# Patient Record
Sex: FEMALE | Race: OTHER | Employment: PART TIME | URBAN - METROPOLITAN AREA
[De-identification: names, ages, dates, MRNs, and addresses within clinical notes are randomized per-mention and may not be internally consistent; named-entity substitution may affect disease eponyms.]

---

## 2017-01-12 ENCOUNTER — HOSPITAL ENCOUNTER (EMERGENCY)
Age: 37
Discharge: HOME | End: 2017-01-12
Attending: FAMILY MEDICINE | Admitting: FAMILY MEDICINE
Payer: SELF-PAY

## 2017-01-12 VITALS
RESPIRATION RATE: 16 BRPM | WEIGHT: 280 LBS | HEART RATE: 90 BPM | TEMPERATURE: 99 F | DIASTOLIC BLOOD PRESSURE: 63 MMHG | OXYGEN SATURATION: 100 % | HEIGHT: 66 IN | SYSTOLIC BLOOD PRESSURE: 129 MMHG | BODY MASS INDEX: 45 KG/M2

## 2017-01-12 DIAGNOSIS — K04.7 DENTAL ABSCESS: Primary | ICD-10-CM

## 2017-01-12 PROCEDURE — 99282 EMERGENCY DEPT VISIT SF MDM: CPT

## 2017-01-12 RX ORDER — TRAMADOL HYDROCHLORIDE 50 MG/1
50 TABLET ORAL
Qty: 15 TAB | Refills: 0 | Status: SHIPPED | OUTPATIENT
Start: 2017-01-12 | End: 2017-02-11

## 2017-01-12 RX ORDER — PENICILLIN V POTASSIUM 500 MG/1
500 TABLET, FILM COATED ORAL 4 TIMES DAILY
Qty: 28 TAB | Refills: 0 | Status: SHIPPED | OUTPATIENT
Start: 2017-01-12 | End: 2017-01-19

## 2017-01-12 RX ORDER — OMEPRAZOLE 20 MG/1
20 CAPSULE, DELAYED RELEASE ORAL DAILY
Qty: 20 CAP | Refills: 0 | Status: SHIPPED | OUTPATIENT
Start: 2017-01-12 | End: 2017-02-11

## 2017-01-12 RX ORDER — LIDOCAINE HYDROCHLORIDE 20 MG/ML
15 SOLUTION OROPHARYNGEAL AS NEEDED
Qty: 1 BOTTLE | Refills: 0 | Status: SHIPPED | OUTPATIENT
Start: 2017-01-12 | End: 2017-02-11

## 2017-01-12 NOTE — ED PROVIDER NOTES
[address]  EMERGENCY DEPARTMENT HISTORY AND PHYSICAL EXAM       Date: 1/12/2017   Patient Name: Arnulfo Barger   YOB: 1980  Medical Record Number: [id number]    History of Presenting Illness     Chief Complaint   Patient presents with    Dental Pain    Palpitations        History Provided By:  patient    Additional History:   11:43 AM   Arnulfo Barger is a 39 y.o. female with a hx of dental pain who presents to the emergency department C/o 8/10 left lower dental pain onset 4 days ago. Associated symptoms include palpitations and subjective fever. Pt reports she has tried Ibuprofen (last few hours ago - 600mg every 3 hours). NKDA. Pt denies chills and any other symptoms or complaints. Primary Care Provider: None   Specialist:    Past History     Past Medical History:   Past Medical History   Diagnosis Date    Abscess         Past Surgical History:   Past Surgical History   Procedure Laterality Date    Hx gyn       tubal ligation    Hx cholecystectomy      Hx heent       tonsils and adenoids        Family History:   History reviewed. No pertinent family history. Social History:   Social History   Substance Use Topics    Smoking status: Former Smoker     Packs/day: 0.50    Smokeless tobacco: None    Alcohol use No        Allergies:   No Known Allergies     Review of Systems   Review of Systems   Constitutional: Positive for fever (subjective). Negative for chills. HENT: Positive for dental problem (left lower). Cardiovascular: Positive for palpitations. All other systems reviewed and are negative. Physical Exam  Vitals:    01/12/17 1134   BP: 129/63   Pulse: 90   Resp: 16   Temp: 99 °F (37.2 °C)   SpO2: 100%   Weight: 127 kg (280 lb)   Height: 5' 6\" (1.676 m)       Physical Exam   Nursing note and vitals reviewed. Vital signs and nursing notes reviewed    CONSTITUTIONAL: Alert; well-developed; overweight. Mild pain distress.    HEAD: Normocephalic, atraumatic  EYES: PERRL; EOM's intact. ENTM: Nose: no rhinorrhea; Throat: no erythema or exudate, mucous membranes moist. Multiple missing teeth, multiple dental caries, and tenderness of the left lower gum line. Neck:  No JVD, supple without lymphadenopathy  RESP: Chest clear, equal breath sounds. CV: S1 and S2 WNL; No murmurs, gallops or rubs. GI: Normal bowel sounds, abdomen soft and non-tender. No masses or organomegaly. : No costo-vertebral angle tenderness. BACK:  Non-tender  UPPER EXT:  Normal inspection  LOWER EXT: No edema, no calf tenderness. Distal pulses intact. NEURO: CN intact, reflexes 2/4 and sym, strength 5/5 and sym, sensation intact. SKIN: No rashes; Normal for age and stage. Multiple tattoos on skin. PSYCH:  Alert and oriented, normal affect. Diagnostic Study Results     Labs -    No results found for this or any previous visit (from the past 12 hour(s)). Medical Decision Making   I am the first provider for this patient. I reviewed the vital signs, available nursing notes, past medical history, past surgical history, family history and social history. Vital Signs-Reviewed the patient's vital signs. Patient Vitals for the past 12 hrs:   Temp Pulse Resp BP SpO2   01/12/17 1134 99 °F (37.2 °C) 90 16 129/63 100 %       Pulse Oximetry Analysis - Normal 100% on room air      Old Medical Records: Nursing notes. Medications Given in the ED:  Medications - No data to display    PROGRESS NOTE:   11:43 AM  Initial assessment performed. Discharge Note:  11:58 AM   Pt has been reexamined. Patient has no new complaints, changes, or physical findings. Care plan outlined and precautions discussed. Results were reviewed with the patient. All medications were reviewed with the patient; will d/c home with Lidocaine, Ultram, Veetid, and Prilosec. All of pt's questions and concerns were addressed.  Patient was instructed and agrees to follow up with dentist, as well as to return to the ED upon further deterioration. Patient is ready to go home. Diagnosis   Clinical Impression:   1. Dental abscess         Discussion:  Patient presents with dental pain. She appears to have an early tooth abscess. Will treat with Penicillin. Will also add some viscous Lidocaine and Tramadol. She will be given a prescription for Prilosec because she has been using so many NSAID's and she may have inflammation of her esophagus. Follow-up Information     Follow up With Details Comments Contact Info    Dentist Schedule an appointment as soon as possible for a visit      [facility] EMERGENCY DEPT  As needed, If symptoms worsen [address] [facility] [address]  [phone]          Discharge Medication List as of 1/12/2017 12:06 PM      START taking these medications    Details   lidocaine (LIDOCAINE VISCOUS) 2 % solution Take 15 mL by mouth as needed for Pain., Normal, Disp-1 Bottle, R-0      traMADol (ULTRAM) 50 mg tablet Take 1 Tab by mouth every six (6) hours as needed for Pain. Max Daily Amount: 200 mg., Print, Disp-15 Tab, R-0      omeprazole (PRILOSEC) 20 mg capsule Take 1 Cap by mouth daily. , Normal, Disp-20 Cap, R-0      penicillin v potassium (VEETID) 500 mg tablet Take 1 Tab by mouth four (4) times daily for 7 days. , Normal, Disp-28 Tab, R-0             _______________________________   Attestations: This note is prepared by Mallory Leyva, acting as scribe for Delisa Dupont MD on 01/12/2017 at 11:39 AM    Delisa Dupont MD: The scribe's documentation has been prepared under my direction and personally reviewed by me and its entirety.      _______________________________

## 2017-01-12 NOTE — ED NOTES
I have reviewed discharge instructions with the patient. The patient verbalized understanding.   Patient armband removed and shredded, scripts x 1 given and 3 sent to pharmacy and verified with pt

## 2017-01-12 NOTE — LETTER
NOTIFICATION RETURN TO WORK / SCHOOL 
 
1/12/2017 12:12 PM 
 
Ms. Jose Daniel Dumas [place] [id number] To Whom It May Concern: 
 
Jose Daniel Dumas is currently under the care of THE [facility] EMERGENCY DEPT. She will return to work/school on: 1/14/2017 If there are questions or concerns please have the patient contact our office. Sincerely, Lupe Chávez RN

## 2017-01-12 NOTE — Clinical Note
Dr. Ann May, [address] 
[phone] [facility]: 
[address] [facility], 101 [facility] 
[phone] Fillings, Cleanings, and Extractions [address] [facility] [address], [address] 
[phone] Fillings, Cleanings, and Extractions [facility] [place] [address] [place] [facility] 159 
[phone] Fillings, Cleaning, and Extractions [facility] Department [address] ws, 3947 [facility] 
[phone] [address] 
[phone] Ages 3-18, if attending [facility] 450 [facility] 
201 [facility], [address] 
[phone] Extractions, Fillings, C jeanette [facility], 11 [facility] 
[phone] Oral Surgery - $70 required Cleanings, Fillings, Extractions - $100 [address],  [address] 
[phone] [employer] [address] [facility] [employer] and [address], [address] Dental Clinic Open September to June

## 2017-01-12 NOTE — LETTER
NOTIFICATION RETURN TO WORK / SCHOOL 
 
1/12/2017 12:09 PM 
 
Ms. Julita Holman [facility] [address] [place] [address] To Whom It May Concern: 
 
Julita Holman is currently under the care of THE [facility] EMERGENCY DEPT. She will return to work/school on: 1/13/2017 If there are questions or concerns please have the patient contact our office. Sincerely, Abdifatah Douglas RN

## 2017-01-12 NOTE — DISCHARGE INSTRUCTIONS
Abscessed Tooth: Care Instructions  Your Care Instructions    An abscessed tooth is a tooth that has a pocket of pus in the tissues around it. Pus forms when the body tries to fight an infection caused by bacteria. If the pus cannot drain, it forms an abscess. An abscessed tooth can cause red, swollen gums and throbbing pain, especially when you chew. You may have a bad taste in your mouth and a fever, and your jaw may swell. Damage to the tooth, untreated tooth decay, or gum disease can cause an abscessed tooth. An abscessed tooth needs to be treated by a dental professional right away. If it is not treated, the infection could spread to other parts of your body. Your dentist will give you antibiotics to stop the infection. He or she may make a hole in the tooth or cut open (leroy) the abscess inside your mouth so that the infection can drain, which should relieve your pain. You may need to have a root canal treatment, which tries to save your tooth by taking out the infected pulp and replacing it with a healing medicine and/or a filling. If these treatments do not work, your tooth may have to be removed. Follow-up care is a key part of your treatment and safety. Be sure to make and go to all appointments, and call your doctor if you are having problems. It's also a good idea to know your test results and keep a list of the medicines you take. How can you care for yourself at home? · Reduce pain and swelling in your face and jaw by putting ice or a cold pack on the outside of your cheek for 10 to 20 minutes at a time. Put a thin cloth between the ice and your skin. · Take pain medicines exactly as directed. ¨ If the doctor gave you a prescription medicine for pain, take it as prescribed. ¨ If you are not taking a prescription pain medicine, ask your doctor if you can take an over-the-counter medicine. · Take your antibiotics as directed. Do not stop taking them just because you feel better.  You need to take the full course of antibiotics. To prevent tooth abscess  · Brush and floss every day, and have regular dental checkups. · Eat a healthy diet, and avoid sugary foods and drinks. · Do not smoke or use spit tobacco. Tobacco use slows your ability to heal. It also increases your risk for gum disease and cancer of the mouth and throat. If you need help quitting, talk to your doctor about stop-smoking programs and medicines. These can increase your chances of quitting for good. When should you call for help? Call 911 anytime you think you may need emergency care. For example, call if:  · You have trouble breathing. Call your doctor now or seek immediate medical care if:  · You are dizzy or lightheaded, or you feel like you may faint. · You have a new or higher fever. · You have swelling, redness, or pain that spreads or gets worse. · You have pus coming from the tooth area. · You have an earache or pain behind your ear. · You have a fever with a stiff neck or a severe headache. · You are sensitive to light or feel very sleepy or confused. Watch closely for changes in your health, and be sure to contact your doctor if:  · You do not get better as expected. Where can you learn more? Go to http://vijay-cassandra.info/. Enter G613 in the search box to learn more about \"Abscessed Tooth: Care Instructions. \"  Current as of: August 9, 2016  Content Version: 11.1  © 6383-0661 [employer]. Care instructions adapted under license by [employer] (which disclaims liability or warranty for this information). If you have questions about a medical condition or this instruction, always ask your healthcare professional. [place] [address] any warranty or liability for your use of this information.

## 2017-01-12 NOTE — ED TRIAGE NOTES
Pt c/o lt lower tooth ahce, onset 4 days ago, pt states lt lower jaw swelling, pt also reports she is taking ibuprofen 600mg every 2-3 hours for pain for 36 hours, taking tylenol 500mg in between pt states she notices that her heart has palpitations when she takes it. Pt states dealing with dental issues for years, pt reports she had a drug addiction that caused teeth to decay, reports clean for 3+ years. Sepsis Screening completed    (  )Patient meets SIRS criteria. ( x )Patient does not meet SIRS criteria.       SIRS Criteria is achieved when two or more of the following are present   Temperature < 96.8°F (36°C) or > 100.9°F (38.3°C)   Heart Rate > 90 beats per minute   Respiratory Rate > 20 beats per minute   WBC count > 12,000 or <4,000 or > 10% bands

## 2017-01-13 ENCOUNTER — HOSPITAL ENCOUNTER (EMERGENCY)
Age: 37
Discharge: HOME | End: 2017-01-13
Attending: EMERGENCY MEDICINE
Payer: SELF-PAY

## 2017-01-13 VITALS
BODY MASS INDEX: 43.39 KG/M2 | SYSTOLIC BLOOD PRESSURE: 152 MMHG | HEIGHT: 66 IN | WEIGHT: 270 LBS | DIASTOLIC BLOOD PRESSURE: 117 MMHG | OXYGEN SATURATION: 100 % | RESPIRATION RATE: 20 BRPM | TEMPERATURE: 98.1 F | HEART RATE: 86 BPM

## 2017-01-13 DIAGNOSIS — Z53.21 PATIENT LEFT WITHOUT BEING SEEN: Primary | ICD-10-CM

## 2017-01-13 PROCEDURE — 75810000275 HC EMERGENCY DEPT VISIT NO LEVEL OF CARE

## 2017-01-13 NOTE — ED TRIAGE NOTES
Patient was seen here yesterday for a dental abscess and states that she has been vomiting and unable to keep anything down including the antibiotics. Patient states that she is also more swollen. Sepsis Screening completed    (  )Patient meets SIRS criteria. (x  )Patient does not meet SIRS criteria.       SIRS Criteria is achieved when two or more of the following are present   Temperature < 96.8°F (36°C) or > 100.9°F (38.3°C)   Heart Rate > 90 beats per minute   Respiratory Rate > 20 beats per minute   WBC count > 12,000 or <4,000 or > 10% bands

## 2017-02-11 ENCOUNTER — HOSPITAL ENCOUNTER (EMERGENCY)
Age: 37
Discharge: HOME | End: 2017-02-11
Attending: FAMILY MEDICINE
Payer: SELF-PAY

## 2017-02-11 VITALS
DIASTOLIC BLOOD PRESSURE: 55 MMHG | WEIGHT: 275 LBS | BODY MASS INDEX: 44.2 KG/M2 | OXYGEN SATURATION: 100 % | HEART RATE: 94 BPM | TEMPERATURE: 98.6 F | SYSTOLIC BLOOD PRESSURE: 102 MMHG | HEIGHT: 66 IN | RESPIRATION RATE: 16 BRPM

## 2017-02-11 DIAGNOSIS — N61.1 LEFT BREAST ABSCESS: ICD-10-CM

## 2017-02-11 DIAGNOSIS — N64.52 NIPPLE DISCHARGE IN FEMALE: Primary | ICD-10-CM

## 2017-02-11 PROCEDURE — 99282 EMERGENCY DEPT VISIT SF MDM: CPT

## 2017-02-11 PROCEDURE — 87070 CULTURE OTHR SPECIMN AEROBIC: CPT | Performed by: PHYSICIAN ASSISTANT

## 2017-02-11 RX ORDER — SULFAMETHOXAZOLE AND TRIMETHOPRIM 800; 160 MG/1; MG/1
2 TABLET ORAL 2 TIMES DAILY
Qty: 40 TAB | Refills: 0 | Status: SHIPPED | OUTPATIENT
Start: 2017-02-11 | End: 2017-02-21

## 2017-02-11 NOTE — ED TRIAGE NOTES
Pt states abscess to lt breast over 1 year intermittently, pt states she does not have insurance so having a hard time getting treatment, pt reports someone suggested surgery at some point, pt states continues to have same issue but having pain in lt breast,

## 2017-02-11 NOTE — LETTER
[facility] [place] 
[facility] 
[address] [id number] [phone] Work/School Note Date: 2/11/2017 To Whom It May concern: 
 
Sandria Moritz was seen and treated today in the emergency room by the following provider(s): 
Attending Provider: Gabriela Mo MD 
Physician Assistant: KAYLEY Madrid. Sandria Moritz was seen in the ED on 2/11/2017. Please excuse her from work on this day. Sincerely, Ileana Burgess PA-C

## 2017-02-11 NOTE — ED PROVIDER NOTES
HPI Comments:   5:56 PM  [place] y.o[place] female presents to the ED C/O chronic abscess to left breast. The abscess have been recurrent since . She has been seen at [facility] in the past for abscess to the same area. She has been told in the past that she needs surgery but she has not had that done due to insurance. Associated sxs of pus leakage from nipple. She has not taken any pain medications or NSAIDs. FMHx of breast cancer. Patient denies fever and any other sxs and complaints. Patient is a [facility] y.[facility]. female presenting with abscess. The history is provided by the patient. No  was used. Abscess    This is a chronic problem. The problem has been gradually worsening. There has been no fever. Affected Location: left breast. The pain has been constant since onset. Associated symptoms include pain. She has tried antibiotic for the symptoms. The treatment provided no relief. Written by DIANA Sibley, as dictated by Rachel Sidhu PA-C   Past Medical History:   Diagnosis Date    Abscess     Anxiety     Bipolar depression [facility])        Past Surgical History:   Procedure Laterality Date    Hx gyn       tubal ligation    Hx cholecystectomy      Hx heent       tonsils and adenoids    Hx  section       x 4         History reviewed. No pertinent family history. Social History     Social History    Marital status:      Spouse name: N/A    Number of children: N/A    Years of education: N/A     Occupational History    Not on file. Social History Main Topics    Smoking status: Former Smoker     Packs/day: 0.50    Smokeless tobacco: Not on file    Alcohol use No    Drug use: Yes     Special: Marijuana    Sexual activity: Not on file     Other Topics Concern    Not on file     Social History Narrative         ALLERGIES: Review of patient's allergies indicates no known allergies. Review of Systems   Constitutional: Negative for fatigue and fever.    HENT: Negative for rhinorrhea and sore throat. Respiratory: Negative for cough and shortness of breath. Cardiovascular: Negative for chest pain and palpitations. Gastrointestinal: Negative for abdominal pain, diarrhea, nausea and vomiting. Genitourinary: Negative for difficulty urinating and dysuria. Musculoskeletal: Negative for arthralgias and myalgias. +left breast pain   Skin: Negative for color change and rash.        +abscess to left breast   Neurological: Negative for light-headedness and headaches. All other systems reviewed and are negative. Vitals:    02/11/17 1749   BP: 102/55   Pulse: 94   Resp: 16   Temp: 98.6 °F (37 °C)   SpO2: 100%   Weight: 124.7 kg (275 lb)   Height: 5' 6\" (1.676 m)            Physical Exam   Constitutional: She is oriented to person, place, and time. She appears well-developed and well-nourished. Well appearing, alert, sitting up on stretcher, non toxic, NAD   HENT:   Head: Normocephalic and atraumatic. Cardiovascular: Normal rate, regular rhythm, normal heart sounds and intact distal pulses. No murmur heard. Pulmonary/Chest: Effort normal and breath sounds normal. No respiratory distress. She has no wheezes. She has no rales. Left breast exhibits mass (tender 1 cm firm nodule just medial to the nipple, white d/c expressed when pressed ) and nipple discharge. No erythema or swelling of the breast   Entire breast mildly TTP    Neurological: She is alert and oriented to person, place, and time. Skin:   Remainder of skin clear    Psychiatric: She has a normal mood and affect. Judgment normal.   Nursing note and vitals reviewed. RESULTS:    No orders to display        Labs Reviewed   CULTURE, WOUND W GRAM STAIN       No results found for this or any previous visit (from the past 12 hour(s)).      MDM  Number of Diagnoses or Management Options  Left breast abscess:   Nipple discharge in female:   Diagnosis management comments: Chronic breast abscess, cyst, no evidence of cellulitis        Amount and/or Complexity of Data Reviewed  Clinical lab tests: reviewed and ordered      MEDICATIONS GIVEN:  Medications - No data to display     Procedures    PROGRESS NOTE:   5:59 PM  Initial Assessment performed  Written by Anjana Rock, ED Scribe, as dictated by Star Huffman PA-C.    DISCUSSION:  Chronic breast abscess, has been seen several times for same problem unable to f/u due to lack of insurance, no drainable area. Pt seen last month for dental problem rx PCN. Prior visits for beast abscess rx Keflex. will culture discharge and rx Bactrim pending culture, consult to . DISCHARGE NOTE:   6:38 PM  Siena Baker  results have been reviewed with her. She has been counseled regarding her diagnosis, treatment, and plan. She verbally conveys understanding and agreement of the signs, symptoms, diagnosis, treatment and prognosis and additionally agrees to follow up as discussed. She also agrees with the care-plan and conveys that all of her questions have been answered. I have also provided discharge instructions for her that include: educational information regarding their diagnosis and treatment, and list of reasons why they would want to return to the ED prior to their follow-up appointment, should her condition change. The patient and/or family has been provided with education for proper Emergency Department utilization. CLINICAL IMPRESSION    1. Nipple discharge in female    2. Left breast abscess         Current Discharge Medication List      START taking these medications    Details   trimethoprim-sulfamethoxazole (BACTRIM DS) 160-800 mg per tablet Take 2 Tabs by mouth two (2) times a day for 10 days.   Qty: 40 Tab, Refills: 0              Follow-up Information     Follow up With Details Comments Contact Info    [facility] CLINIC Call As needed [address] [facility], [address] [facility] [address]    [facility] EMERGENCY DEPT  As needed, If symptoms worsen [address] [place] [address]  [phone]           ATTESTATION:   This note is prepared by Sana Merlos acting as Scribe for Star Huffman PA-C. Star Huffman PA-C: The scribe's documentation has been prepared under my direction and personally reviewed by me in its entirety. I confirm that the note above accurately reflects all work, treatment, procedures, and medical decision making performed by me.

## 2017-02-11 NOTE — ED NOTES
Visualization of left breast abscess deferred, patient already in street clothes at time of assessment and discharge.

## 2017-02-11 NOTE — DISCHARGE INSTRUCTIONS
Nipple Discharge: Care Instructions  Your Care Instructions  Fluid leaking from one or both nipples when you are not breastfeeding is called nipple discharge. Clear, cloudy, or white discharge that appears only when you press on your nipple is usually normal. The more the nipple is pressed or stimulated, the more fluid appears. Yellow, green, or brown discharge is not normal and may be a symptom of an infection or other problem. Spontaneous discharge appears without pressing or stimulating the nipple. This is not normal unless you are pregnant or breastfeeding. It may be a side effect of a medicine, or it may be caused by other health problems. The treatment of spontaneous nipple discharge depends on what is causing it. You may need additional tests to find out what is causing the nipple discharge. Follow-up care is a key part of your treatment and safety. Be sure to make and go to all appointments, and call your doctor if you are having problems. Its also a good idea to know your test results and keep a list of the medicines you take. How can you care for yourself at home? · If your doctor gave you medicine, take it exactly as prescribed. Call your doctor if you think you are having a problem with your medicine. · Wear a supportive bra, such as a sports bra or jog bra. · Avoid stimulating your breast until you have your follow-up appointment. When should you call for help? Call your doctor now or seek immediate medical care if:  · You have symptoms of a breast infection, such as:  ¨ Increased pain, swelling, redness, or warmth around a breast.  ¨ Red streaks extending from the breast.  ¨ Pus draining from a breast.  ¨ A fever. Watch closely for changes in your health, and be sure to contact your doctor if:  · You notice any changes in your breast or discharge. · You do not get better as expected. Where can you learn more? Go to http://vijay-cassandra.info/.   Enter E135 in the search box to learn more about \"Nipple Discharge: Care Instructions. \"  Current as of: May 27, 2016  Content Version: 11.1  © 0028-8976 [employer]. Care instructions adapted under license by [employer] (which disclaims liability or warranty for this information). If you have questions about a medical condition or this instruction, always ask your healthcare professional. [address] any warranty or liability for your use of this information. Skin Abscess: Care Instructions  Your Care Instructions    A skin abscess is a bacterial infection that forms a pocket of pus. A boil is a kind of skin abscess. The doctor may have cut an opening in the abscess so that the pus can drain out. You may have gauze in the cut so that the abscess will stay open and keep draining. You may need antibiotics. You will need to follow up with your doctor to make sure the infection has gone away. The doctor has checked you carefully, but problems can develop later. If you notice any problems or new symptoms, get medical treatment right away. Follow-up care is a key part of your treatment and safety. Be sure to make and go to all appointments, and call your doctor if you are having problems. It's also a good idea to know your test results and keep a list of the medicines you take. How can you care for yourself at home? · Apply warm and dry compresses, a heating pad set on low, or a hot water bottle 3 or 4 times a day for pain. Keep a cloth between the heat source and your skin. · If your doctor prescribed antibiotics, take them as directed. Do not stop taking them just because you feel better. You need to take the full course of antibiotics. · Take pain medicines exactly as directed. ¨ If the doctor gave you a prescription medicine for pain, take it as prescribed.   ¨ If you are not taking a prescription pain medicine, ask your doctor if you can take an over-the-counter medicine. · Keep your bandage clean and dry. Change the bandage whenever it gets wet or dirty, or at least one time a day. · If the abscess was packed with gauze:  ¨ Keep follow-up appointments to have the gauze changed or removed. If the doctor instructed you to remove the gauze, gently pull out all of the gauze when your doctor tells you to. ¨ After the gauze is removed, soak the area in warm water for 15 to 20 minutes 2 times a day, until the wound closes. When should you call for help? Call your doctor now or seek immediate medical care if:  · You have signs of worsening infection, such as:  ¨ Increased pain, swelling, warmth, or redness. ¨ Red streaks leading from the infected skin. ¨ Pus draining from the wound. ¨ A fever. Watch closely for changes in your health, and be sure to contact your doctor if:  · You do not get better as expected. Where can you learn more? Go to http://vijay-cassandra.info/. Enter T651 in the search box to learn more about \"Skin Abscess: Care Instructions. \"  Current as of: February 5, 2016  Content Version: 11.1  © 9837-3712 [employer]. Care instructions adapted under license by [employer] (which disclaims liability or warranty for this information). If you have questions about a medical condition or this instruction, always ask your healthcare professional. [place] [address] any warranty or liability for your use of this information.

## 2017-02-14 LAB
BACTERIA SPEC CULT: ABNORMAL
BACTERIA SPEC CULT: ABNORMAL
GRAM STN SPEC: ABNORMAL
SERVICE CMNT-IMP: ABNORMAL

## 2017-08-17 ENCOUNTER — HOSPITAL ENCOUNTER (EMERGENCY)
Age: 37
Discharge: HOME | End: 2017-08-17
Attending: EMERGENCY MEDICINE
Payer: SELF-PAY

## 2017-08-17 VITALS
DIASTOLIC BLOOD PRESSURE: 57 MMHG | HEIGHT: 65 IN | SYSTOLIC BLOOD PRESSURE: 123 MMHG | WEIGHT: 290 LBS | RESPIRATION RATE: 16 BRPM | BODY MASS INDEX: 48.32 KG/M2 | OXYGEN SATURATION: 100 % | TEMPERATURE: 98.2 F | HEART RATE: 77 BPM

## 2017-08-17 DIAGNOSIS — Z76.0 MEDICATION REFILL: ICD-10-CM

## 2017-08-17 DIAGNOSIS — F31.31 BIPOLAR AFFECTIVE DISORDER, CURRENTLY DEPRESSED, MILD (HCC): Primary | ICD-10-CM

## 2017-08-17 DIAGNOSIS — F43.10 PTSD (POST-TRAUMATIC STRESS DISORDER): ICD-10-CM

## 2017-08-17 LAB
APPEARANCE UR: CLEAR
BACTERIA URNS QL MICRO: ABNORMAL /HPF
BILIRUB UR QL: NEGATIVE
COLOR UR: YELLOW
EPITH CASTS URNS QL MICRO: ABNORMAL /LPF (ref 0–5)
GLUCOSE UR STRIP.AUTO-MCNC: NEGATIVE MG/DL
HCG UR QL: NEGATIVE
HGB UR QL STRIP: ABNORMAL
KETONES UR QL STRIP.AUTO: ABNORMAL MG/DL
LEUKOCYTE ESTERASE UR QL STRIP.AUTO: NEGATIVE
MUCOUS THREADS URNS QL MICRO: ABNORMAL /LPF
NITRITE UR QL STRIP.AUTO: NEGATIVE
PH UR STRIP: 5.5 [PH] (ref 5–8)
PROT UR STRIP-MCNC: NEGATIVE MG/DL
RBC #/AREA URNS HPF: ABNORMAL /HPF (ref 0–5)
SERVICE CMNT-IMP: NORMAL
SP GR UR REFRACTOMETRY: >1.03 (ref 1–1.03)
UROBILINOGEN UR QL STRIP.AUTO: 1 EU/DL (ref 0.2–1)
WBC URNS QL MICRO: ABNORMAL /HPF (ref 0–5)
WET PREP GENITAL: NORMAL

## 2017-08-17 PROCEDURE — 99285 EMERGENCY DEPT VISIT HI MDM: CPT

## 2017-08-17 PROCEDURE — 87210 SMEAR WET MOUNT SALINE/INK: CPT | Performed by: EMERGENCY MEDICINE

## 2017-08-17 PROCEDURE — 81001 URINALYSIS AUTO W/SCOPE: CPT | Performed by: EMERGENCY MEDICINE

## 2017-08-17 PROCEDURE — 81025 URINE PREGNANCY TEST: CPT

## 2017-08-17 PROCEDURE — 87491 CHLMYD TRACH DNA AMP PROBE: CPT | Performed by: EMERGENCY MEDICINE

## 2017-08-17 RX ORDER — TRAZODONE HYDROCHLORIDE 50 MG/1
50 TABLET ORAL
Qty: 15 TAB | Refills: 0 | Status: SHIPPED | OUTPATIENT
Start: 2017-08-17 | End: 2018-02-11

## 2017-08-17 RX ORDER — ESCITALOPRAM OXALATE 20 MG/1
20 TABLET ORAL DAILY
Qty: 15 TAB | Refills: 0 | Status: SHIPPED | OUTPATIENT
Start: 2017-08-17 | End: 2018-02-11

## 2017-08-17 NOTE — ED PROVIDER NOTES
HPI Comments: 3:25 PM     Leeroy Pacheco is a 40 y.o. Female with hx of bipolar disorder, depression, and anxiety presenting to the ED C/O gradually worsening anxiety and depression starting 6 months ago. Associated sxs include \"not breathing right\". Pt reports she was previously on medications for her mental health but has run out of these medications 2 years ago due to a move 3 years ago. Pt states that she stopped taking these medications because she \"thought I was fixed\". Pt is unclear on which medications these are, but reports she was on Trazadone. Pt reports she has an appointment for mental health in 1.5 weeks, . Denies cigarette use, etoh use, and illicit drug use. Denies chance of pregnancy; shx of  tubal ligation and she is currently on her menses. Pt denies suicidal ideations, self-injury, and any other symptoms or complaints. Written by Benja James ED Scribvin, as dictated by Samara Loza MD     Patient is a 40 y.o. female presenting with anxiety. The history is provided by the patient. No  was used. Anxiety    This is a chronic problem. The current episode started more than 1 week ago (6 months ago). The problem has been gradually worsening. Past Medical History:   Diagnosis Date    Abscess     Anxiety     Bipolar depression (HCC)        Past Surgical History:   Procedure Laterality Date    HX  SECTION      x 4    HX CHOLECYSTECTOMY      HX GYN      tubal ligation    HX HEENT      tonsils and adenoids         History reviewed. No pertinent family history. Social History     Social History    Marital status:      Spouse name: N/A    Number of children: N/A    Years of education: N/A     Occupational History    Not on file.      Social History Main Topics    Smoking status: Former Smoker     Packs/day: 0.50    Smokeless tobacco: Never Used    Alcohol use No    Drug use: No    Sexual activity: Not on file     Other Topics Concern    Not on file     Social History Narrative         ALLERGIES: Review of patient's allergies indicates no known allergies. Review of Systems   Genitourinary: Positive for vaginal bleeding. Psychiatric/Behavioral: Negative for self-injury and suicidal ideas. The patient is nervous/anxious. All other systems reviewed and are negative. Vitals:    08/17/17 1521   BP: 123/57   Pulse: 77   Resp: 16   Temp: 98.2 °F (36.8 °C)   SpO2: 100%   Weight: 131.5 kg (290 lb)   Height: 5' 5\" (1.651 m)            Physical Exam   Constitutional: She is oriented to person, place, and time. She appears well-developed and well-nourished. No distress. obese   HENT:   Head: Normocephalic and atraumatic. Eyes: Pupils are equal, round, and reactive to light. Neck: Neck supple. Cardiovascular: Normal rate, regular rhythm, S1 normal, S2 normal and normal heart sounds. Pulmonary/Chest: Breath sounds normal. No respiratory distress. She has no wheezes. She has no rales. She exhibits no tenderness. Abdominal: Soft. She exhibits no distension and no mass. There is no tenderness. There is no guarding. Musculoskeletal: Normal range of motion. She exhibits no edema or tenderness. Neurological: She is alert and oriented to person, place, and time. No cranial nerve deficit. Skin: No rash noted. Psychiatric: Her behavior is normal. Thought content normal. Her mood appears anxious. Nursing note and vitals reviewed.        RESULTS:    PULSE OXIMETRY NOTE:  Pulse-ox is 100% on room air  Interpretation: normal       No orders to display        Labs Reviewed   URINALYSIS W/ RFLX MICROSCOPIC - Abnormal; Notable for the following:        Result Value    Specific gravity >1.030 (*)     Ketone TRACE (*)     Blood LARGE (*)     All other components within normal limits   URINE MICROSCOPIC ONLY - Abnormal; Notable for the following:     Bacteria FEW (*)     Mucus FEW (*)     All other components within normal limits   WET PREP   CHLAMYDIA/NEISSERIA AMPLIFICATION   HCG URINE, QL. - POC       Recent Results (from the past 12 hour(s))   URINALYSIS W/ RFLX MICROSCOPIC    Collection Time: 08/17/17  3:45 PM   Result Value Ref Range    Color YELLOW      Appearance CLEAR      Specific gravity >1.030 (H) 1.005 - 1.030    pH (UA) 5.5 5.0 - 8.0      Protein NEGATIVE  NEG mg/dL    Glucose NEGATIVE  NEG mg/dL    Ketone TRACE (A) NEG mg/dL    Bilirubin NEGATIVE  NEG      Blood LARGE (A) NEG      Urobilinogen 1.0 0.2 - 1.0 EU/dL    Nitrites NEGATIVE  NEG      Leukocyte Esterase NEGATIVE  NEG     WET PREP    Collection Time: 08/17/17  3:45 PM   Result Value Ref Range    Special Requests: NO SPECIAL REQUESTS      Wet prep NO YEAST,TRICHOMONAS OR CLUE CELLS NOTED     URINE MICROSCOPIC ONLY    Collection Time: 08/17/17  3:45 PM   Result Value Ref Range    WBC 0 to 3 0 - 5 /hpf    RBC 4 to 10 0 - 5 /hpf    Epithelial cells FEW 0 - 5 /lpf    Bacteria FEW (A) NEG /hpf    Mucus FEW (A) NEG /lpf   HCG URINE, QL. - POC    Collection Time: 08/17/17  4:13 PM   Result Value Ref Range    Pregnancy test,urine (POC) NEGATIVE  NEG          MDM  Number of Diagnoses or Management Options  Bipolar affective disorder, currently depressed, mild ([facility] [address]):   Medication refill:   PTSD (post-traumatic stress disorder):     ED Course     MEDICATIONS GIVEN:  Medications - No data to display     Pelvic Exam  Date/Time: 8/17/2017 4:17 PM  Performed by: attending  Procedure duration:  5 minutes. Documented by:  Teodora Bui ED Scribe. As dictated by:  Alessio Melgar MD  Exam assisted by:  Anatoliy Krueger RN. Type of exam performed: bimanual and speculum. External genitalia appearance: normal.    Vaginal exam:  discharge. The amount of discharge was:  mild. The discharge was bloody. Cervical exam:  discharge from cervix. Bimanual exam:  normal.    Comments: Unable to palpate the uterus well due to body habitus.            PROGRESS NOTE:  3:25 PM  Initial assessment performed. Written by Elizabet Oliveira ED Scribe, as dictated by Suyapa Matthews MD    PROGRESS NOTE:   3:44 PM  I have discussed the patient with . They have helped the patient before and states she already has an appointment with [facility] for 8/28. Written by DIANA Chibvin, as dictated by Suyapa Matthews MD.     PROGRESS NOTE:   3:57 PM  Discussed the patient with Nava Grant from [facility]. Pt already has an appointment with [facility] on the 28 and will be scheduled for a psychiatry appointment once she is evaluated. Written by DIANA Chibe, as dictated by Suyapa Matthews MD.     DISCHARGE NOTE:  4:57 PM   Trena Booker  results have been reviewed with her. She has been counseled regarding her diagnosis, treatment, and plan. She verbally conveys understanding and agreement of the signs, symptoms, diagnosis, treatment and prognosis and additionally agrees to follow up as discussed. She also agrees with the care-plan and conveys that all of her questions have been answered. I have also provided discharge instructions for her that include: educational information regarding their diagnosis and treatment, and list of reasons why they would want to return to the ED prior to their follow-up appointment, should her condition change. The patient and/or family has been provided with education for proper Emergency Department utilization. CLINICAL IMPRESSION:    1. Bipolar affective disorder, currently depressed, mild ([address]    2. PTSD (post-traumatic stress disorder)    3.  Medication refill        PLAN: DISCHARGE HOME    Follow-up Information     Follow up With Details Comments Contact Info    [facility] Go in 11 days For your appointment as scheduled [address] EMERGENCY DEPT  As needed, If symptoms worsen [address] [facility] [address]  [phone]          Current Discharge Medication List      START taking these medications    Details   escitalopram oxalate (LEXAPRO) 20 mg tablet Take 1 Tab by mouth daily. Qty: 15 Tab, Refills: 0      traZODone (DESYREL) 50 mg tablet Take 1 Tab by mouth nightly. Qty: 15 Tab, Refills: 0             ATTESTATIONS:  This note is prepared by Saumya Laws, acting as Scribe for [employer], MD.    [employer], MD: The scribe's documentation has been prepared under my direction and personally reviewed by me in its entirety. I confirm that the note above accurately reflects all work, treatment, procedures, and medical decision making performed by me.

## 2017-08-17 NOTE — ED NOTES
Amb into ED today w/ reports she took herself off her medications for depression/anxiety 2 years ago thinking she could handle it. Reports she was fired from her job today as a  - has little support locally and feels stressed out. Pt also reports heavier than normal period w/ \"gush\" of blood today while at work - prompting her employer to tell her to go home and not come back. Cristal Wesley

## 2017-08-17 NOTE — ED TRIAGE NOTES
Pt states she is on her period but having heavy bleeding, c/o regular period pain per pt, pt also states she has anxiety and depression, pt states has appt to get help with [employer]. Pt states she has no money or MD to get meds. Sepsis Screening completed    (  )Patient meets SIRS criteria. (  x)Patient does not meet SIRS criteria.       SIRS Criteria is achieved when two or more of the following are present   Temperature < 96.8°F (36°C) or > 100.9°F (38.3°C)   Heart Rate > 90 beats per minute   Respiratory Rate > 20 breaths per minute   WBC count > 12,000 or <4,000 or > 10% bands

## 2017-08-17 NOTE — LETTER
[facility] [place] 
[facility] 
[address] [id number] [phone] Work/School Note Date: 8/17/2017 To Whom It May concern: 
 
Elsa Jj was seen and treated today in the emergency room by the following provider(s): 
Attending Provider: Domitila Kulkarni MD. Please excuse missed work. Elsa Jj may return to work on 8/19/2017. Sincerely, [employer], MD

## 2017-08-17 NOTE — DISCHARGE INSTRUCTIONS
Bipolar Disorder: Care Instructions  Your Care Instructions  Bipolar disorder is an illness that causes extreme mood changes, from times of very high energy (manic episodes) to times of depression. But many people with bipolar disorder show only the symptoms of depression. These moods may cause problems with your work, school, family life, friendships, and how well you function. This disease is also called manic-depression. There is no cure for bipolar disorder, but it can be helped with medicines. Counseling may also help. It is important to take your medicines exactly as prescribed, even when you feel well. You may need lifelong treatment. Follow-up care is a key part of your treatment and safety. Be sure to make and go to all appointments, and call your doctor if you are having problems. It's also a good idea to know your test results and keep a list of the medicines you take. How can you care for yourself at home? · Be safe with medicines. Take your medicines exactly as prescribed. Do not stop or change a medicine without talking to your doctor first. aJson Hopson and your doctor may need to try different combinations of medicines to find what works for you. · Take your medicines on schedule to keep your moods even. When you feel good, you may think that you do not need your medicines. But it is important to keep taking them. · Go to your counseling sessions. Call and talk with your counselor if you can't go to a session or if you don't think the sessions are helping. Do not just stop going. · Get at least 30 minutes of activity on most days of the week. Walking is a good choice. You also may want to do other things, such as running, swimming, or cycling. · Get enough sleep. Keep your room dark and quiet. Try to go to bed at the same time every night. · Eat a healthy diet. This includes whole grains, dairy, fruits, vegetables, and protein. Eat foods from each of these groups. · Try to lower your stress. Manage your time, build a strong support system, and lead a healthy lifestyle. To lower your stress, try physical activity, slow deep breathing, or getting a massage. · Do not use alcohol or illegal drugs. · Learn the early signs of your mood changes. You can then take steps to help yourself feel better. · Ask for help from friends and family when you need it. You may need help with daily chores when you are depressed. When you are manic, you may need support to control your high energy levels. What should you do if someone in your family has bipolar disorder? · Learn about the disease and the signs that it is getting worse. · Remind your family member that you love him or her. · Make a plan with all family members about how to take care of your loved one when his or her symptoms are bad. · Talk about your fears and concerns and those of other family members. Seek counseling if needed. · Do not focus attention only on the person who is in treatment. · Remind yourself that it will take time for changes to occur. · Do not blame yourself for the disease. · Know your legal rights and the legal rights of your family member. Support groups or counselors can help you with this information. · Take care of yourself. Keep up with your own interests, such as your career, hobbies, and friends. Use exercise, positive self-talk, deep breathing, and other relaxing exercises to help lower your stress. · Give yourself time to grieve. You may need to deal with emotions such as anger, fear, and frustration. After you work through your feelings, you will be better able to care for yourself and your family. · If you are having a hard time with your feelings or with your relationship with your family member, talk with a counselor. When should you call for help? Call 911 anytime you think you may need emergency care. For example, call if:  · You feel like hurting yourself or someone else.   · Someone who has bipolar disorder displays dangerous behavior, and you think the person might hurt himself or herself or someone else. Call your doctor now or seek immediate medical care if:  · You hear voices. · Someone you know has bipolar disorder and talks about suicide. Keep the numbers for these national suicide hotlines: 9-830-432-TALK (1-191.113.5251) and 1-163-TROOTVF (5-786.878.8213). If a suicide threat seems real, with a specific plan and a way to carry it out, stay with the person, or ask someone you trust to stay with the person, until you can get help. · Someone you know has bipolar disorder and:  ¨ Starts to give away possessions. ¨ Is using illegal drugs or drinking alcohol heavily. ¨ Talks or writes about death, including writing suicide notes or talking about guns, knives, or pills. ¨ Talks or writes about hurting someone else. ¨ Starts to spend a lot of time alone. ¨ Acts very aggressively or suddenly appears calm. ¨ Talks about beliefs that are not based in reality (delusions). Watch closely for changes in your health, and be sure to contact your doctor if:  · You cannot go to your counseling sessions. Where can you learn more? Go to http://vijay-cassandra.info/. Enter K052 in the search box to learn more about \"Bipolar Disorder: Care Instructions. \"  Current as of: July 26, 2016  Content Version: 11.3  © 4808-9592 [employer]. Care instructions adapted under license by [employer] (which disclaims liability or warranty for this information). If you have questions about a medical condition or this instruction, always ask your healthcare professional. [place] [address] any warranty or liability for your use of this information. Post-Traumatic Stress Disorder (PTSD): Care Instructions  Your Care Instructions  Post-traumatic stress disorder (PTSD) is a mental condition that can result from being in or seeing a traumatic or terrifying event.  These events can include combat, a terrorist attack, a natural disaster, a serious accident, an assault, or a rape. If you have PTSD, you may often relive the experience in nightmares or flashbacks. These are clear and frightening memories of the event. You may also have trouble sleeping. PTSD affects people in very different ways. It can interfere with daily activities such as work or school, and it can make you withdraw from friends or loved ones. Follow-up care is a key part of your treatment and safety. Be sure to make and go to all appointments, and call your doctor if you are having problems. It's also a good idea to know your test results and keep a list of the medicines you take. How can you care for yourself at home? · Take medicines exactly as directed. Call your doctor if you think you are having a problem with your medicine. · Go to your counseling sessions and follow-up appointments. · Recognize and accept your anxiety. Then, when you are in a situation that makes you anxious, say to yourself, \"This is not an emergency. I feel uncomfortable, but I am not in danger. I can keep going even if I feel anxious. \"  · Be kind to your body:  ¨ Relieve tension with exercise or a massage. ¨ Get enough rest.  ¨ Avoid alcohol, caffeine, nicotine, and illegal drugs. They can increase your anxiety level and cause sleep problems. ¨ Learn and do relaxation techniques. See below for more about these techniques. · Engage your mind. Get out and do something you enjoy. Go to a funny movie, or take a walk or hike. Plan your day. Having too much or too little to do can make you anxious. · Keep a record of your symptoms. Discuss your fears with a good friend or family member, or join a support group for people with similar problems. Talking to others sometimes relieves stress. · Get involved in social groups, or volunteer to help others. Being alone sometimes makes things seem worse than they are.   · Get at least 27 minutes of exercise on most days of the week. Walking is a good choice. You also may want to do other activities, such as running, swimming, cycling, or playing tennis or team sports. · Keep the numbers for these national suicide hotlines: 4-772-170-TALK (2-589.211.5907) and 0-299-ZVGKGBW (5-757.330.6162). If you or someone you know talks about suicide or feeling hopeless, get help right away. Relaxation techniques  Do relaxation exercises 10 to 20 minutes a day. You can play soothing, relaxing music while you do them, if you wish. · Tell others in your house that you are going to do your relaxation exercises. Ask them not to disturb you. · Find a comfortable place, away from all distractions and noise. · Lie down on your back, or sit with your back straight. · Focus on your breathing. Make it slow and steady. · Breathe in through your nose. Breathe out through either your nose or mouth. · Breathe deeply, filling up the area between your navel and your rib cage. Breathe so that your belly goes up and down. · Do not hold your breath. · Breathe like this for 5 to 10 minutes. Notice the feeling of calmness throughout your whole body. As you continue to breathe slowly and deeply, relax by doing the following for another 5 to 10 minutes:  · Tighten and relax each muscle group in your body. You can begin at your toes and work your way up to your head. · Imagine your muscle groups relaxing and becoming heavy. · Empty your mind of all thoughts. · Let yourself relax more and more deeply. · Become aware of the state of calmness that surrounds you. · When your relaxation time is over, you can bring yourself back to alertness by moving your fingers and toes and then your hands and feet and then stretching and moving your entire body. Sometimes people fall asleep during relaxation, but they usually wake up shortly afterward.   · Always give yourself time to return to full alertness before you drive a car or do anything that might cause an accident if you are not fully alert. Never play a relaxation tape while you drive a car. When should you call for help? Call 911 anytime you think you may need emergency care. For example, call if:  · You feel you cannot stop from hurting yourself or someone else. Watch closely for changes in your health, and be sure to contact your doctor if:  · Your PTSD symptoms are getting worse. · You have new or worsening symptoms of anxiety. · You are not getting better as expected. Where can you learn more? Go to http://vijay-cassandra.info/. Warner Davis in the search box to learn more about \"Post-Traumatic Stress Disorder (PTSD): Care Instructions. \"  Current as of: July 26, 2016  Content Version: 11.3  © 4941-2599 [employer]. Care instructions adapted under license by [employer] (which disclaims liability or warranty for this information). If you have questions about a medical condition or this instruction, always ask your healthcare professional. [address] any warranty or liability for your use of this information.

## 2017-08-18 LAB
C TRACH RRNA SPEC QL NAA+PROBE: NEGATIVE
N GONORRHOEA RRNA SPEC QL NAA+PROBE: NEGATIVE
SPECIMEN SOURCE: NORMAL

## 2018-02-11 ENCOUNTER — APPOINTMENT (OUTPATIENT)
Dept: GENERAL RADIOLOGY | Age: 38
End: 2018-02-11
Attending: EMERGENCY MEDICINE
Payer: SELF-PAY

## 2018-02-11 ENCOUNTER — HOSPITAL ENCOUNTER (EMERGENCY)
Age: 38
Discharge: HOME | End: 2018-02-11
Attending: EMERGENCY MEDICINE
Payer: SELF-PAY

## 2018-02-11 VITALS
HEART RATE: 74 BPM | SYSTOLIC BLOOD PRESSURE: 153 MMHG | HEIGHT: 65 IN | DIASTOLIC BLOOD PRESSURE: 84 MMHG | OXYGEN SATURATION: 100 % | RESPIRATION RATE: 18 BRPM | BODY MASS INDEX: 48.32 KG/M2 | WEIGHT: 290 LBS | TEMPERATURE: 98.8 F

## 2018-02-11 DIAGNOSIS — J06.9 VIRAL UPPER RESPIRATORY ILLNESS: Primary | ICD-10-CM

## 2018-02-11 DIAGNOSIS — R05.9 COUGH: ICD-10-CM

## 2018-02-11 PROCEDURE — 71046 X-RAY EXAM CHEST 2 VIEWS: CPT

## 2018-02-11 PROCEDURE — 99282 EMERGENCY DEPT VISIT SF MDM: CPT

## 2018-02-11 PROCEDURE — 74011000250 HC RX REV CODE- 250: Performed by: EMERGENCY MEDICINE

## 2018-02-11 PROCEDURE — 94640 AIRWAY INHALATION TREATMENT: CPT

## 2018-02-11 RX ORDER — ALBUTEROL SULFATE 90 UG/1
2 AEROSOL, METERED RESPIRATORY (INHALATION)
Qty: 1 INHALER | Refills: 0 | Status: SHIPPED | OUTPATIENT
Start: 2018-02-11

## 2018-02-11 RX ORDER — IPRATROPIUM BROMIDE AND ALBUTEROL SULFATE 2.5; .5 MG/3ML; MG/3ML
3 SOLUTION RESPIRATORY (INHALATION)
Status: COMPLETED | OUTPATIENT
Start: 2018-02-11 | End: 2018-02-11

## 2018-02-11 RX ORDER — CODEINE PHOSPHATE AND GUAIFENESIN 10; 100 MG/5ML; MG/5ML
10 SOLUTION ORAL
Qty: 118 ML | Refills: 0 | Status: SHIPPED | OUTPATIENT
Start: 2018-02-11

## 2018-02-11 RX ADMIN — IPRATROPIUM BROMIDE AND ALBUTEROL SULFATE 3 ML: .5; 3 SOLUTION RESPIRATORY (INHALATION) at 10:05

## 2018-02-11 NOTE — ED NOTES
Discharge instructions reviewed with the patient with opportunity for questions given. The patient verbalized understanding. Patient armband removed and shredded. VS stable. Patient in stable condition at time of discharge.

## 2018-02-11 NOTE — ED TRIAGE NOTES
Patient with complaints of cough for 2 weeks. Patient denies fever and states that she sometimes see pink in her sputum production.

## 2018-02-11 NOTE — ED PROVIDER NOTES
EMERGENCY DEPARTMENT HISTORY AND PHYSICAL EXAM    Date: 2018  Patient Name: Armando Jamison    History of Presenting Illness     Chief Complaint   Patient presents with    Cough         History Provided By: Patient    Chief Complaint: cough  Duration: 8 Days  Location: ENT  Severity: 6 out of 10  Modifying Factors: deep breathing exacerbates coughing  Associated Symptoms: arthralgias/myalgias, reflux sxs (hx of PUD), dark stools (pt taking Pepto Bismol), and SOB. Additional History (Context):   9:49 AM  Armando Jamison is a 40 y.o. female who presents to the emergency department C/O 6/10 cough with streaky pink sputum onset 8 days ago. Pt felt she was improving 4 days ago from flu-like sxs, but then began having worsening chest congestion followed by cough. Associated sxs include arthralgias/myalgias, reflux sxs (hx of PUD), dark stools (pt taking Pepto Bismol), and SOB. Pt has tried Pepto Bismol to help with reflux. PMHx of abscess, anxiety, and bipolar depression. PSHx includes tubal ligation, tonsillectomy, and . Pt denies hx of asthma. Pt is a former smoker and a non EtOH user. NKDA. Pt denies ear pain and any other sxs or complaints. PCP: None        Past History     Past Medical History:  Past Medical History:   Diagnosis Date    Abscess     Anxiety     Bipolar depression ([facility] [address]        Past Surgical History:  Past Surgical History:   Procedure Laterality Date    HX  SECTION      x 4    HX CHOLECYSTECTOMY      HX GYN      tubal ligation    HX HEENT      tonsils and adenoids       Family History:  History reviewed. No pertinent family history. Social History:  Social History   Substance Use Topics    Smoking status: Former Smoker     Packs/day: 0.50    Smokeless tobacco: Never Used    Alcohol use No       Allergies:  No Known Allergies      Review of Systems   Review of Systems   HENT: Positive for congestion. Negative for ear pain.     Respiratory: Positive for cough and shortness of breath. Gastrointestinal:        (+) Reflux  (+) Dark stools   Musculoskeletal: Positive for arthralgias and myalgias. All other systems reviewed and are negative. Physical Exam     Vitals:    02/11/18 0945 02/11/18 1007 02/11/18 1053   BP: (!) 144/93  153/84   Pulse: 93  74   Resp: 20  18   Temp: 98.8 °F (37.1 °C)     SpO2: 100% 100%    Weight: 131.5 kg (290 lb)     Height: 5' 5\" (1.651 m)       Physical Exam   Nursing note and vitals reviewed. Constitutional: Alert. Well appearing, no acute distress  Head: Normocephalic, Atraumatic  Eyes: Pupils are equal, round, and reactive to light, EOMI  ENT: Moist mucous membranes, oropharynx clear. Serous effusions behind the TMs bilaterally. Neck: Supple, non-tender  Cardiovascular: Regular rate and rhythm, no murmurs, rubs, or gallops  Chest: Normal work of breathing and chest excursion bilaterally. No reproducible chest tenderness. Lungs:  Coughing with deep inspiration. Expiratory wheezing at the bases with diminished air movement bilaterally at the bases. No crackles. Abdomen: Soft, non tender, non distended, normoactive bowel sounds  Back: No evidence of trauma or deformity. No CVA Tenderness. Extremities: No evidence of trauma or deformity, no LE edema  Skin: Warm and dry  Neuro: Alert and appropriate, facial movement symmetric, normal speech, strength and sensation full and symmetric bilaterally, normal gait, normal coordination  Psychiatric: Normal mood and affect     Diagnostic Study Results     Labs -   No results found for this or any previous visit (from the past 12 hour(s)). Radiologic Studies -     CT Results  (Last 48 hours)    None        CXR Results  (Last 48 hours)               02/11/18 1035  XR CHEST PA LAT Final result    Impression:  IMPRESSION:       1. No evidence for acute cardiopulmonary process. Narrative:  EXAM: CHEST PA AND LATERAL. INDICATION: Chest pain.        COMPARISON: 12/26/2016.       _______________       FINDINGS:           Cardiac size is within normal limits. Mediastinal structures and pulmonary vasculature are within normal limits. No focal areas of consolidation. Artifact projects at the right upper lobe. No evidence for pneumothorax. No evidence for pleural effusion. Mild spinal degenerative changes. Right upper quadrant surgical clips.       _______________             As read by the radiologist.     Medications given in the ED-  Medications   albuterol-ipratropium (DUO-NEB) 2.5 MG-0.5 MG/3 ML (3 mL Nebulization Given 2/11/18 1005)         Medical Decision Making   I am the first provider for this patient. I reviewed the vital signs, available nursing notes, past medical history, past surgical history, family history and social history. Vital Signs-Reviewed the patient's vital signs. Pulse Oximetry Analysis - 100% on  Room air. Records Reviewed: Nursing Notes    Provider Notes (Medical Decision Making): 40year old female presents for cough and sxs consistent with URI likely viral in nature. VSS. CXR clear. Feels improved and had resolution of wheezing after Duoneb tx. Will d/c with an inhaler and instructions for continued sx management at home. Return precautions provided. Procedures:  Procedures    ED Course:   9:49 AM Initial assessment performed. The patients presenting problems have been discussed, and they are in agreement with the care plan formulated and outlined with them. I have encouraged them to ask questions as they arise throughout their visit. Diagnosis and Disposition       DISCHARGE NOTE:  10:46 AM  Jose Vegas  results have been reviewed with her. She has been counseled regarding her diagnosis, treatment, and plan. She verbally conveys understanding and agreement of the signs, symptoms, diagnosis, treatment and prognosis and additionally agrees to follow up as discussed.   She also agrees with the care-plan and conveys that all of her questions have been answered. I have also provided discharge instructions for her that include: educational information regarding their diagnosis and treatment, and list of reasons why they would want to return to the ED prior to their follow-up appointment, should her condition change. She has been provided with education for proper emergency department utilization. CLINICAL IMPRESSION:    1. Viral upper respiratory illness    2. Cough        PLAN:  1. D/C Home  2. Discharge Medication List as of 2/11/2018 10:47 AM      START taking these medications    Details   albuterol (PROVENTIL HFA, VENTOLIN HFA, PROAIR HFA) 90 mcg/actuation inhaler Take 2 Puffs by inhalation every four (4) hours as needed for Wheezing., Normal, Disp-1 Inhaler, R-0      guaiFENesin-codeine (ROBITUSSIN AC) 100-10 mg/5 mL solution Take 10 mL by mouth three (3) times daily as needed for Cough. Max Daily Amount: 30 mL., Print, Disp-118 mL, R-0           3. Follow-up Information     Follow up With Details Comments Contact Info    [facility] Schedule an appointment as soon as possible for a visit For [place] [id number] [facility], [address] [facility] [address] [facility] [address]    [facility] EMERGENCY DEPT Go to If symptoms worsen, As needed [address] [place] [id number]  [phone]        _______________________________    Attestations: This note is prepared by Ce Harrison, acting as Scribe for Gilma iVllanueva MD.    Gilma Villanueva MD:  The scribe's documentation has been prepared under my direction and personally reviewed by me in its entirety.   I confirm that the note above accurately reflects all work, treatment, procedures, and medical decision making performed by me.  _______________________________

## 2018-02-11 NOTE — DISCHARGE INSTRUCTIONS
Cough: Care Instructions  Your Care Instructions    A cough is your body's response to something that bothers your throat or airways. Many things can cause a cough. You might cough because of a cold or the flu, bronchitis, or asthma. Smoking, postnasal drip, allergies, and stomach acid that backs up into your throat also can cause coughs. A cough is a symptom, not a disease. Most coughs stop when the cause, such as a cold, goes away. You can take a few steps at home to cough less and feel better. Follow-up care is a key part of your treatment and safety. Be sure to make and go to all appointments, and call your doctor if you are having problems. It's also a good idea to know your test results and keep a list of the medicines you take. How can you care for yourself at home? · Drink lots of water and other fluids. This helps thin the mucus and soothes a dry or sore throat. Honey or lemon juice in hot water or tea may ease a dry cough. · Take cough medicine as directed by your doctor. · Prop up your head on pillows to help you breathe and ease a dry cough. · Try cough drops to soothe a dry or sore throat. Cough drops don't stop a cough. Medicine-flavored cough drops are no better than candy-flavored drops or hard candy. · Do not smoke. Avoid secondhand smoke. If you need help quitting, talk to your doctor about stop-smoking programs and medicines. These can increase your chances of quitting for good. When should you call for help? Call 911 anytime you think you may need emergency care. For example, call if:  ? · You have severe trouble breathing. ?Call your doctor now or seek immediate medical care if:  ? · You cough up blood. ? · You have new or worse trouble breathing. ? · You have a new or higher fever. ? · You have a new rash. ? Watch closely for changes in your health, and be sure to contact your doctor if:  ? · You cough more deeply or more often, especially if you notice more mucus or a change in the color of your mucus. ? · You have new symptoms, such as a sore throat, an earache, or sinus pain. ? · You do not get better as expected. Where can you learn more? Go to http://vijay-cassandra.info/. Enter D279 in the search box to learn more about \"Cough: Care Instructions. \"  Current as of: May 12, 2017  Content Version: 11.4  © 5398-1239 [employer]. Care instructions adapted under license by [employer] (which disclaims liability or warranty for this information). If you have questions about a medical condition or this instruction, always ask your healthcare professional. [place] [address] any warranty or liability for your use of this information. Viral Respiratory Infection: Care Instructions  Your Care Instructions    Viruses are very small organisms. They grow in number after they enter your body. There are many types that cause different illnesses, such as colds and the mumps. The symptoms of a viral respiratory infection often start quickly. They include a fever, sore throat, and runny nose. You may also just not feel well. Or you may not want to eat much. Most viral respiratory infections are not serious. They usually get better with time and self-care. Antibiotics are not used to treat a viral infection. That's because antibiotics will not help cure a viral illness. In some cases, antiviral medicine can help your body fight a serious viral infection. Follow-up care is a key part of your treatment and safety. Be sure to make and go to all appointments, and call your doctor if you are having problems. It's also a good idea to know your test results and keep a list of the medicines you take. How can you care for yourself at home? · Rest as much as possible until you feel better. · Be safe with medicines. Take your medicine exactly as prescribed.  Call your doctor if you think you are having a problem with your medicine. You will get more details on the specific medicine your doctor prescribes. · Take an over-the-counter pain medicine, such as acetaminophen (Tylenol), ibuprofen (Advil, Motrin), or naproxen (Aleve), as needed for pain and fever. Read and follow all instructions on the label. Do not give aspirin to anyone younger than 20. It has been linked to Reye syndrome, a serious illness. · Drink plenty of fluids, enough so that your urine is light yellow or clear like water. Hot fluids, such as tea or soup, may help relieve congestion in your nose and throat. If you have kidney, heart, or liver disease and have to limit fluids, talk with your doctor before you increase the amount of fluids you drink. · Try to clear mucus from your lungs by breathing deeply and coughing. · Gargle with warm salt water once an hour. This can help reduce swelling and throat pain. Use 1 teaspoon of salt mixed in 1 cup of warm water. · Do not smoke or allow others to smoke around you. If you need help quitting, talk to your doctor about stop-smoking programs and medicines. These can increase your chances of quitting for good. To avoid spreading the virus  · Cough or sneeze into a tissue. Then throw the tissue away. · If you don't have a tissue, use your hand to cover your cough or sneeze. Then clean your hand. You can also cough into your sleeve. · Wash your hands often. Use soap and warm water. Wash for 15 to 20 seconds each time. · If you don't have soap and water near you, you can clean your hands with alcohol wipes or gel. When should you call for help? Call your doctor now or seek immediate medical care if:  ? · You have a new or higher fever. ? · Your fever lasts more than 48 hours. ? · You have trouble breathing. ? · You have a fever with a stiff neck or a severe headache. ? · You are sensitive to light. ? · You feel very sleepy or confused. ? Watch closely for changes in your health, and be sure to contact your doctor if:  ? · You do not get better as expected. Where can you learn more? Go to http://vijay-cassandra.info/. Enter E436 in the search box to learn more about \"Viral Respiratory Infection: Care Instructions. \"  Current as of: May 12, 2017  Content Version: 11.4  © 7171-6100 [employer]. Care instructions adapted under license by [employer] (which disclaims liability or warranty for this information). If you have questions about a medical condition or this instruction, always ask your healthcare professional. [address] any warranty or liability for your use of this information.

## 2018-02-11 NOTE — LETTER
[facility] [place] 
THE [facility] EMERGENCY DEPT 
[address] [id number] 
[phone] Work/School Note Date: 2/11/2018 To Whom It May concern: 
 
Gisselle Ferrer was seen and treated today in the emergency room by the following provider(s): 
Attending Provider: Alex Townsend MD. Gisselle Ferrer may return to work on 2/13/2018. Sincerely, Barbara Vicente MD